# Patient Record
Sex: FEMALE | URBAN - METROPOLITAN AREA
[De-identification: names, ages, dates, MRNs, and addresses within clinical notes are randomized per-mention and may not be internally consistent; named-entity substitution may affect disease eponyms.]

---

## 2022-10-16 ENCOUNTER — NURSE TRIAGE (OUTPATIENT)
Dept: ADMINISTRATIVE | Facility: CLINIC | Age: 72
End: 2022-10-16

## 2022-10-16 NOTE — TELEPHONE ENCOUNTER
Spoke with patient states she went to do a wellness visit with her PCP last week.  Patient states PCP sent her to the [facility] related to blood in her stool, back pain, coughing, and difficulty with movement.  Patient states she is still having severe back pain with nausea.  Patient is dizzy and feels like passing out when standing.   She states pain brings her to her knees. Patient denies falling.  States she is home alone but her son is 15 minutes away and in route.  Advised patient to call EMS-911 to seek immediate medical attention.  Patient verbalized understanding.   Follow up call complete patient speaking with 911 dispatch at this time.   Reason for Disposition   Passed out (i.e., lost consciousness, collapsed and was not responding)    Protocols used: Back Pain-A-AH